# Patient Record
Sex: MALE | Race: WHITE | NOT HISPANIC OR LATINO
[De-identification: names, ages, dates, MRNs, and addresses within clinical notes are randomized per-mention and may not be internally consistent; named-entity substitution may affect disease eponyms.]

---
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
## 2024-05-10 NOTE — ASSESSMENT
[FreeTextEntry1] : Jerson has urinary frequency and urgency without meatal stenosis at this time. But he does also has constipation. I will send his urine off for calcium and creatinine to determine whether he has hypercalciuria. In addition we will treat his bowels with Exlax and Prune Juice.  He will return to do uroflow the postvoid residual to get a better sense of his voiding dynamics after his bowel management regimen. All questions were answered. Stable.